# Patient Record
Sex: MALE | Race: WHITE | NOT HISPANIC OR LATINO | Employment: OTHER | URBAN - METROPOLITAN AREA
[De-identification: names, ages, dates, MRNs, and addresses within clinical notes are randomized per-mention and may not be internally consistent; named-entity substitution may affect disease eponyms.]

---

## 2023-10-12 PROBLEM — C20: Status: ACTIVE | Noted: 2023-10-12

## 2023-10-12 PROBLEM — E78.5 HYPERLIPIDEMIA: Status: ACTIVE | Noted: 2023-10-12

## 2023-10-12 PROBLEM — G25.0 ESSENTIAL TREMOR: Status: ACTIVE | Noted: 2023-10-12

## 2023-10-12 PROBLEM — I48.91 ATRIAL FIBRILLATION (MULTI): Status: ACTIVE | Noted: 2023-10-12

## 2023-10-12 PROBLEM — C67.9 MALIGNANT NEOPLASM OF URINARY BLADDER (MULTI): Status: ACTIVE | Noted: 2023-10-12

## 2023-10-12 PROBLEM — C61 MALIGNANT NEOPLASM OF PROSTATE (MULTI): Status: ACTIVE | Noted: 2023-10-12

## 2023-10-12 PROBLEM — I10 HTN (HYPERTENSION): Status: ACTIVE | Noted: 2023-10-12

## 2023-10-12 RX ORDER — LANOLIN ALCOHOL/MO/W.PET/CERES
CREAM (GRAM) TOPICAL
COMMUNITY

## 2023-10-12 RX ORDER — ASPIRIN 81 MG
1 TABLET, DELAYED RELEASE (ENTERIC COATED) ORAL AS NEEDED
COMMUNITY

## 2023-10-12 RX ORDER — METOPROLOL SUCCINATE 100 MG/1
TABLET, EXTENDED RELEASE ORAL
COMMUNITY

## 2023-10-12 RX ORDER — DIPHENOXYLATE HYDROCHLORIDE AND ATROPINE SULFATE 2.5; .025 MG/1; MG/1
2 TABLET ORAL EVERY 6 HOURS PRN
COMMUNITY
Start: 2022-10-10

## 2023-10-12 RX ORDER — LORATADINE 10 MG/1
1 TABLET ORAL AS NEEDED
COMMUNITY

## 2023-10-12 RX ORDER — ACETAMINOPHEN 500 MG
TABLET ORAL
COMMUNITY

## 2023-10-12 RX ORDER — VIT A/VIT C/VIT E/ZINC/COPPER 2148-113
TABLET ORAL
COMMUNITY

## 2023-10-12 RX ORDER — ATORVASTATIN CALCIUM 40 MG/1
TABLET, FILM COATED ORAL
COMMUNITY

## 2023-10-12 RX ORDER — ACETAMINOPHEN 325 MG/1
2 TABLET ORAL EVERY 6 HOURS PRN
COMMUNITY
Start: 2021-07-30

## 2023-10-13 ENCOUNTER — CLINICAL SUPPORT (OUTPATIENT)
Dept: SURGERY | Facility: CLINIC | Age: 88
End: 2023-10-13
Payer: MEDICARE

## 2023-10-13 DIAGNOSIS — Z43.3 COLOSTOMY CARE (MULTI): ICD-10-CM

## 2023-10-13 PROCEDURE — 99211 OFF/OP EST MAY X REQ PHY/QHP: CPT | Performed by: SURGERY

## 2023-10-13 NOTE — NURSING NOTE
"[facility] nursing visit outcome: Stoma and pouching system were assessed. 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 Recommendations for modified wound care were provided as detailed below.      [facility] next scheduled visit/plan: about 2 weeks     Stoma Type: End Colostomy  Roe: No  Diameter: varies with care, about 1 5/8\"  Location: LUQ  Protrusion:  budded to prolapsed  Mucosal Condition and Color:  moist, red, some bumps  Mucocutaneous Junction: Intact  Peristomal Skin: Ulceration, Location: at superior side, Size: 1.3 x 5 cm  close to stoma is healing scar tissue; at superior edge is small amount of bleeding with hypergranulated tissue  Location of Skin Impairment: as noted  Peristomal Contour: Bulged and hernia  Supportive Tissue: Semi-Soft  Character of Output:  tan, pasty stool  Emptying Frequency: about 2 per day  Removed/Current Pouching System: 2 3/4\" ConvaTec  Flat Moldable, Accordion wafer , Natura Invisiclose pouch,  Hollihesive over xeroform, caulk inferior 3 edges with stoma paste, Wafer extender at superior edge    Current Wearing Time: about 2 Days    Recommendations:   Skin Care: silver nitrate applied to hyper-granulated tissue, and to granulomas on stoma (approved by Dr. Quintana); covered area with single layer of xeroform - at home use stoma powder as needed before applying the xeroform  Pouching System: as above  Wear Time: 2-3 Days  Other: continue with NuHope belt with prolapse strap    Photo: 10/13/2023       Supplier: [employer]    Comments: Wife and daughter were present. Handwritten instructions and care plan were provided    Time Increment: 45 minutes    ANA RuvalcabaN, RN, CWOCN  With ANA HectorN, RN, W[facility], CWOCN    "

## 2023-10-27 ENCOUNTER — CLINICAL SUPPORT (OUTPATIENT)
Dept: SURGERY | Facility: CLINIC | Age: 88
End: 2023-10-27
Payer: MEDICARE

## 2023-10-27 DIAGNOSIS — Z43.3 COLOSTOMY CARE (MULTI): ICD-10-CM

## 2023-10-27 PROCEDURE — 99211 OFF/OP EST MAY X REQ PHY/QHP: CPT | Mod: 25 | Performed by: SURGERY

## 2023-10-27 PROCEDURE — 2500000002 HC RX 250 W HCPCS SELF ADMINISTERED DRUGS (ALT 637 FOR MEDICARE OP, ALT 636 FOR OP/ED)

## 2023-10-27 NOTE — NURSING NOTE
"[facility] nursing visit outcome: Mr. Umanzor returns for continued care of peristomal ulceration.  Area of purple hued skin around the stoma has increased. Wound continues to bleed during pouch change/stoma care.     [facility] next scheduled visit/plan: about 2 weeks    Stoma Type: End Colostomy (descending)  Roe: No  Diameter: about 1 5/8\" (changes some with care)  Location: LUQ  Protrusion:  elongated to prolapsed, reduces with position  Mucosal Condition and Color:  moist, red, bumpy  Mucocutaneous Junction: Intact  Peristomal Skin: Ulceration, Location: 12 o'clock, Size: 2 x 5 cm; 0.1 depth to slightly higher than skin level; moist, red to bleeding base, purple edges,   Location of Skin Impairment: there is scar tissue between stoma an the open wound at 12 o'clock  Peristomal Contour: Bulged  Supportive Tissue: Semi-Soft  Character of Output:  pasty tan stool  Emptying Frequency: about 2 or more per day  Removed/Current Pouching System: 70 mm ConvaTec Accordion Flat moldable - flexibility slits,  xeroform over wound, covered with Hollihesive, caulked with stoma paste , Natura Invisiclose pouch, Wafer Extenders at superior side of tape collar, NuHope belt with prolapse strap    Current Wearing Time: 2-3 Days    Recommendations:   Skin Care: after cleaning the wound, applied fluticasone today (okayed by Dr. Quintana) to wound base and covered with stoma powder, covered with Biatin foam, then secured with Hollihesive and caulked with stoma paste  Pouching System: 0 mm ConvaTec Accordion Flat moldable - flexibility slits, wound care as noted - stoma powder and Biatin foam, Natura Invisiclose pouch, Wafer Extenders at superior side of tape collar, NuHope belt with prolapse strap  Wear Time: 2-3 Days  Other: TBD    Photo: 10/27/2023     Supplier: [employer]    Comments: Mrs. Umanzor and patient's daughter were present during care. Handwritten care plan was provided.      Time Increment: 60 minutes    Veronica Moore, ANAN, RN, CWOCN  "   With ANA HectorN, RN, [facility], CWOCN

## 2023-11-14 ENCOUNTER — CLINICAL SUPPORT (OUTPATIENT)
Dept: SURGERY | Facility: CLINIC | Age: 88
End: 2023-11-14
Payer: MEDICARE

## 2023-11-14 DIAGNOSIS — Z43.3 COLOSTOMY CARE (MULTI): ICD-10-CM

## 2023-11-14 PROCEDURE — 99211 OFF/OP EST MAY X REQ PHY/QHP: CPT | Performed by: SURGERY

## 2023-11-15 PROBLEM — Z43.3 COLOSTOMY CARE (MULTI): Status: ACTIVE | Noted: 2023-11-15

## 2023-11-15 NOTE — NURSING NOTE
"[facility] nursing visit outcome: Mr. Umanzor returned to the outpatient clinic for follow up assessment and care of peristomal ulceration. Site looks improved today with some healing tissue noted. Some stoma powder was adhered to the wound base and was not removed today, so healing tissue would not be disrupted.     [facility] next scheduled visit/plan: about 2 weeks    Stoma Type: End Colostomy  Roe: No  Diameter: varies with care, about 1 3/4\"  Location: LUQ  Protrusion: Prolapsed  Mucosal Condition and Color:  moist, red, bumpy  Mucocutaneous Junction: Intact  Peristomal Skin: Ulceration, Location: superior side of stoma, Size: bleeding area measured 1 x 1.9 x 0.2 cm; overall area, including healing area is 3.8 x 6 x 0.2 cm  Location of Skin Impairment: as noted  Peristomal Contour: Bulged and has hernia  Supportive Tissue: Semi-Soft  Character of Output: Pasty Stool  Emptying Frequency: about 2 per day  Removed/Current Pouching System: 2 3/4\" ConvaTec Flat moldable, Stoma Paste, Natura Invisiclose pouch, NuHope Belt and hollihesive wedge with \"flap\" to cover wound, caulk with stoma paste,  wafer extender along superior side  Current Wearing Time: about 2-3 Days    Recommendations:   Skin Care: can continue with home application of fluticasone to wound base, fill with stoma powder, cover with foam dressing (Biatin or Mepilex transfer) - secured with Hollihesive skin barrier wedge - NOTE - if some stoma powder is adhered to the ulceration, do not rub it to remove, leave it in place and apply more powder after gently cleansing the area; also use trickle of water to remove the the foam dressing, if adhered to the wound  Pouching System: as above  Wear Time: 2-3 Days  Other: continue with NuHope     Photo: 11/15/2023     Supplier: [employer]    Comments: Mepilex transfer was provided. He still has some Biatin foam at home. Handwritten instructions were provided.     Time Increment: 45 minutes    Veronica Moore, AANN, RN, CWOCN   With " ANA HectorN, RN, [facility], CWOCN

## 2023-12-05 ENCOUNTER — CLINICAL SUPPORT (OUTPATIENT)
Dept: SURGERY | Facility: CLINIC | Age: 88
End: 2023-12-05
Payer: MEDICARE

## 2023-12-05 DIAGNOSIS — Z43.3 COLOSTOMY CARE (MULTI): ICD-10-CM

## 2023-12-05 NOTE — NURSING NOTE
[facility] nursing visit outcome: Mr. Umanzor returns to the outpatient Ostomy clinic for reassessment and care of peristomal ulcerations. Visual improvement noted today increasing amount of healing tissue noted to ulceration.      [facility] next scheduled visit/plan: about 2 week. Patient/family will call if changes to appointment are needed.     Stoma Type: End Colostomy  Roe: No  Diameter: varies with care ~ 1 3/4  Location: LUQ  Protrusion:  prolapsed   Mucosal Condition and Color: Moist, Red, bumpy   Mucocutaneous Junction: Intact  Peristomal Skin: fresh epithelium present to superior aspect of stoma.   Location of Skin Impairment: healing area superior to stoma   Peristomal Contour: Bulged and has hernia   Supportive Tissue: Semi-Soft  Character of Output: Pasty Stool  Emptying Frequency: as needed   Removed/Current Pouching System: 2 3/4 ConvTec Flat moldable, stoma paste, Natura invisicolse pouch, NuHope belt and hollihesive wedge with flap to cover foam, caulk with stoma paste, wafer extender along superior side.   Current Wearing Time: 2-3 Days    Recommendations:   Skin Care: Please continue with home application of fluticasone to healing wound, fill with stoma powder, cover with foam dressing (Biatine or Mepilex Transfer) Secure with Hollihesive skin barrier wedge, ( If stomahesive powder is adhered to the ulceration, do not rub it to remove, leave in place and apply more powder after gently cleasning the area) Use trickle of water to remove foam dressing if adhered.   Pouching System: as above   Wear Time: 2-3 Days  Other: continue with NuHope belt           Photo: 12/5/2023       Supplier: [employer]    Comments: Biatin foam applied. Some additional supplies provided.     Time Increment: 45 minutes     Rebeca Rodriguez, ANAN, RN, W[facility], CWOCN   GINNA Ruvalcaba, RN, CWOCN

## 2023-12-16 ENCOUNTER — HOSPITAL ENCOUNTER (EMERGENCY)
Facility: HOSPITAL | Age: 88
Discharge: HOME | End: 2023-12-16
Attending: STUDENT IN AN ORGANIZED HEALTH CARE EDUCATION/TRAINING PROGRAM
Payer: MEDICARE

## 2023-12-16 VITALS
HEIGHT: 67 IN | RESPIRATION RATE: 18 BRPM | WEIGHT: 180 LBS | HEART RATE: 79 BPM | OXYGEN SATURATION: 97 % | SYSTOLIC BLOOD PRESSURE: 92 MMHG | TEMPERATURE: 97.8 F | BODY MASS INDEX: 28.25 KG/M2 | DIASTOLIC BLOOD PRESSURE: 60 MMHG

## 2023-12-16 DIAGNOSIS — R11.2 NAUSEA AND VOMITING, UNSPECIFIED VOMITING TYPE: Primary | ICD-10-CM

## 2023-12-16 DIAGNOSIS — K94.01: ICD-10-CM

## 2023-12-16 PROCEDURE — 2500000005 HC RX 250 GENERAL PHARMACY W/O HCPCS: Performed by: NURSE PRACTITIONER

## 2023-12-16 PROCEDURE — 99283 EMERGENCY DEPT VISIT LOW MDM: CPT | Performed by: STUDENT IN AN ORGANIZED HEALTH CARE EDUCATION/TRAINING PROGRAM

## 2023-12-16 RX ORDER — ONDANSETRON 4 MG/1
4 TABLET, ORALLY DISINTEGRATING ORAL ONCE
Status: COMPLETED | OUTPATIENT
Start: 2023-12-16 | End: 2023-12-16

## 2023-12-16 RX ORDER — ONDANSETRON 4 MG/1
4 TABLET, FILM COATED ORAL 3 TIMES DAILY
Qty: 10 TABLET | Refills: 0 | Status: SHIPPED | OUTPATIENT
Start: 2023-12-16

## 2023-12-16 RX ADMIN — ONDANSETRON 4 MG: 4 TABLET, ORALLY DISINTEGRATING ORAL at 18:59

## 2023-12-16 ASSESSMENT — PAIN - FUNCTIONAL ASSESSMENT
PAIN_FUNCTIONAL_ASSESSMENT: 0-10
PAIN_FUNCTIONAL_ASSESSMENT: 0-10

## 2023-12-16 ASSESSMENT — PAIN SCALES - GENERAL
PAINLEVEL_OUTOF10: 0 - NO PAIN
PAINLEVEL_OUTOF10: 1

## 2023-12-17 NOTE — ED PROVIDER NOTES
Chief Complaint   Patient presents with    GI Problem     Pt has a stoma. Pt has a stoma and had blood in the bag yesterday. Pt has hx cancer. Pt on eloquis.        HPI       89 year old male presents to the Emergency Department today complaining of having bleeding noted from his colostomy site yesterday. Reports that he held pressure on the site and got the bleeding to subside. Reports that when they changed to the colostomy bag earlier today, it began bleeding again. Reports that he has an underlying history of liver cancer for which he stop taking chemotherapy last year. Does not want aggressive treatment and wants to be comfortable. Reports that he has been declining as of late. Has had a decreased appetite with intermittent nausea and vomiting. Family reports that they are going to call hospice on Monday. Currently on Eliquis for atrial fibrillation. Denies any associated fever, chills, headache, neck pain, chest pain, shortness of breath, abdominal pain, diarrhea, constipation, or urinary symptoms.       History provided by:  Patient             Patient History   No past medical history on file.  Past Surgical History:   Procedure Laterality Date    OTHER SURGICAL HISTORY  07/30/2020    Back surgery    OTHER SURGICAL HISTORY  07/30/2020    Bladder surgery    OTHER SURGICAL HISTORY  07/30/2020    Corneal lasik    OTHER SURGICAL HISTORY  07/30/2020    Gallbladder surgery    OTHER SURGICAL HISTORY  07/30/2020    Eye surgery     Family History   Problem Relation Name Age of Onset    Throat cancer Father       Social History     Tobacco Use    Smoking status: Not on file    Smokeless tobacco: Not on file   Substance Use Topics    Alcohol use: Not on file    Drug use: Not on file           Physical Exam  Constitutional:       Appearance: Normal appearance.   HENT:      Head: Normocephalic.      Comments: Kalina noted to bilateral conjunctiva.      Right Ear: Tympanic membrane, ear canal and external ear normal.       Left Ear: Tympanic membrane, ear canal and external ear normal.      Nose: Nose normal.      Mouth/Throat:      Mouth: Mucous membranes are moist.      Pharynx: Oropharynx is clear. No oropharyngeal exudate or posterior oropharyngeal erythema.   Eyes:      Conjunctiva/sclera: Conjunctivae normal.      Pupils: Pupils are equal, round, and reactive to light.   Cardiovascular:      Rate and Rhythm: Normal rate and regular rhythm.      Pulses:           Radial pulses are 3+ on the right side and 3+ on the left side.        Dorsalis pedis pulses are 3+ on the right side and 3+ on the left side.      Heart sounds: Normal heart sounds. No murmur heard.     No friction rub. No gallop.   Pulmonary:      Effort: Pulmonary effort is normal. No respiratory distress.      Breath sounds: Normal breath sounds. No wheezing, rhonchi or rales.   Abdominal:      General: Abdomen is flat. Bowel sounds are normal.      Palpations: Abdomen is soft.      Tenderness: There is no abdominal tenderness. There is no right CVA tenderness, left CVA tenderness, guarding or rebound. Negative signs include Moulton's sign and McBurney's sign.   Musculoskeletal:         General: No swelling or deformity.      Cervical back: Full passive range of motion without pain.      Right lower leg: No edema.      Left lower leg: No edema.   Lymphadenopathy:      Cervical: No cervical adenopathy.   Skin:     Capillary Refill: Capillary refill takes less than 2 seconds.      Coloration: Skin is not jaundiced.      Findings: No rash.   Neurological:      General: No focal deficit present.      Mental Status: He is alert and oriented to person, place, and time. Mental status is at baseline.      Gait: Gait is intact.   Psychiatric:         Mood and Affect: Mood normal.         Behavior: Behavior is cooperative.         Labs Reviewed - No data to display    No orders to display            ED Course & MDM   Diagnoses as of 12/16/23 2016   Nausea and vomiting,  unspecified vomiting type   Bleeding from colostomy stoma (CMS/[facility])           Medical Decision Making  Patient was seen and evaluated by Dr. Richards. We took off the colostomy bag and there was no further bleeding. Clinically, there was nothing to cauterize. Repeat abdominal evaluation reveals an abdomen soft, nondistended, and nontender to palpation. There was no rebound, rigidity, or guarding noted. There were no peritoneal signs noted. Continued to have multiple benign serial abdominal exams. At this time, we find no underlying evidence of acute pancreatitis, cholecystitis, cholangitis, diverticulitis, or appendicitis. Therefore, we do not feel that a CT scan is clinically indicated. That being said, the family and patient do not want aggressive measures. He did have an episode of nausea and vomiting that improved with the use of Zofran. Will be given a prescription for such. Follow up with their doctor in 3 days. Return if worse in any way. Discharged in stable condition with computer instructions.    Diagnostic Impression:     1. Bleeding from stoma site.     2. Prescription therapy            Your medication list        ASK your doctor about these medications        Instructions Last Dose Given Next Dose Due   acetaminophen 500 mg tablet  Commonly known as: Tylenol           acetaminophen 325 mg tablet  Commonly known as: Tylenol           atorvastatin 40 mg tablet  Commonly known as: Lipitor           cyanocobalamin 1,000 mcg tablet  Commonly known as: Vitamin B-12           docusate sodium 100 mg tablet  Commonly known as: Colace           Eliquis 5 mg tablet  Generic drug: apixaban           LomotiL 2.5-0.025 mg tablet  Generic drug: diphenoxylate-atropine           loratadine 10 mg tablet  Commonly known as: Claritin           magnesium oxide 400 mg (241.3 mg magnesium) tablet  Commonly known as: Mag-Ox           metoprolol succinate  mg 24 hr tablet  Commonly known as: Toprol-XL           MULTIPLE  VITAMINS ORAL           PreserVision AREDS 2,148 mcg-113 mg-45 mg-17.4mg tablet  Generic drug: vitamins A,C,E-zinc-copper           PRESERVISION AREDS-2 ORAL           SALINE MIST NASL                      Procedure  Procedures     CATHLEEN Wolfe-CNP  12/16/23 2016

## 2023-12-19 ENCOUNTER — APPOINTMENT (OUTPATIENT)
Dept: SURGERY | Facility: CLINIC | Age: 88
End: 2023-12-19
Payer: MEDICARE